# Patient Record
Sex: FEMALE | Race: WHITE | NOT HISPANIC OR LATINO | URBAN - METROPOLITAN AREA
[De-identification: names, ages, dates, MRNs, and addresses within clinical notes are randomized per-mention and may not be internally consistent; named-entity substitution may affect disease eponyms.]

---

## 2024-07-05 ENCOUNTER — TELEPHONE (OUTPATIENT)
Dept: NUTRITION | Facility: CLINIC | Age: 74
End: 2024-07-05

## 2024-07-05 NOTE — TELEPHONE ENCOUNTER
----- Message from Niranjan Simons sent at 7/5/2024  1:13 PM EDT -----  Regarding: Spoke to patient  She is checking with her PCP and will call back if she wants to schedule NP OV.    ----- Message -----  From: Niranjan Simons  Sent: 7/2/2024   3:36 PM EDT  To: Niranjan Simons; #  Subject: Left a message for patient                       to schedule NP OV for GERD - Referred by Katelin Silverio